# Patient Record
Sex: FEMALE | Race: OTHER | HISPANIC OR LATINO | URBAN - METROPOLITAN AREA
[De-identification: names, ages, dates, MRNs, and addresses within clinical notes are randomized per-mention and may not be internally consistent; named-entity substitution may affect disease eponyms.]

---

## 2022-09-27 ENCOUNTER — EMERGENCY (EMERGENCY)
Facility: HOSPITAL | Age: 57
LOS: 1 days | Discharge: HOME | End: 2022-09-27
Attending: EMERGENCY MEDICINE
Payer: COMMERCIAL

## 2022-09-27 VITALS
SYSTOLIC BLOOD PRESSURE: 145 MMHG | WEIGHT: 188.05 LBS | RESPIRATION RATE: 18 BRPM | TEMPERATURE: 98 F | OXYGEN SATURATION: 96 % | HEART RATE: 74 BPM | HEIGHT: 59 IN | DIASTOLIC BLOOD PRESSURE: 88 MMHG

## 2022-09-27 VITALS — SYSTOLIC BLOOD PRESSURE: 138 MMHG | DIASTOLIC BLOOD PRESSURE: 78 MMHG | HEART RATE: 70 BPM

## 2022-09-27 NOTE — ED PROVIDER NOTE - PATIENT PORTAL LINK FT
You can access the FollowMyHealth Patient Portal offered by [facility] by registering at the following website: [email or web address]. By joining [facility]’s FollowMyHealth portal, you will also be able to view your health information using other applications (apps) compatible with our system.

## 2022-09-27 NOTE — ED PROVIDER NOTE - CLINICAL SUMMARY MEDICAL DECISION MAKING FREE TEXT BOX
Will rule out intracranial hemorrhage and knee fracture. Patient declined pain medication. Will perform CT head, and close wound with Dermabond. Patient already up to date on tetanus vaccination.

## 2022-09-27 NOTE — ED PROVIDER NOTE - PROGRESS NOTE DETAILS
Pt CT head negative, xray negative and is ambulating without problems. Pt is comfortable, and eager for d/c.

## 2022-09-27 NOTE — ED PROVIDER NOTE - NOSE FINDINGS
1.5 cm well-closed laceration over the bridge of the nose. No need for sutures. No current bleeding, and minimal swelling.

## 2022-09-27 NOTE — ED PROVIDER NOTE - OBJECTIVE STATEMENT
57 year old female with PMHX of diabetes mellitus (on insulin), hypertension, and hypercholesterolemia presenting to the ED S/P a fall today. Patient states that she tripped and fell while walking, cause her to fall forward and strike the front of her head and injure her left knee. Patient is now noting pain to those areas, as well as a laceration to the bridge of her nose. Patient denies any loss of consciousness or any other symptoms, and endorses that she has been able to bear weight on her left lower extremity with pain. Of note, patient is currently on Plavix.   Allergies: iodine (SOB; urticaria; rash)

## 2022-09-27 NOTE — ED ADULT NURSE NOTE - NSIMPLEMENTINTERV_GEN_ALL_ED
Implemented All Fall with Harm Risk Interventions:  [place] to call system. Call bell, personal items and telephone within reach. Instruct patient to call for assistance. Room bathroom lighting operational. Non-slip footwear when patient is off stretcher. Physically safe environment: no spills, clutter or unnecessary equipment. Stretcher in lowest position, wheels locked, appropriate side rails in place. Provide visual cue, wrist band, yellow gown, etc. Monitor gait and stability. Monitor for mental status changes and reorient to person, place, and time. Review medications for side effects contributing to fall risk. Reinforce activity limits and safety measures with patient and family. Provide visual clues: red socks.